# Patient Record
Sex: FEMALE | URBAN - METROPOLITAN AREA
[De-identification: names, ages, dates, MRNs, and addresses within clinical notes are randomized per-mention and may not be internally consistent; named-entity substitution may affect disease eponyms.]

---

## 2022-12-12 ENCOUNTER — LAB OUTSIDE AN ENCOUNTER (OUTPATIENT)
Dept: URBAN - METROPOLITAN AREA CLINIC 23 | Facility: CLINIC | Age: 32
End: 2022-12-12

## 2022-12-12 ENCOUNTER — TELEPHONE ENCOUNTER (OUTPATIENT)
Dept: URBAN - METROPOLITAN AREA CLINIC 78 | Facility: CLINIC | Age: 32
End: 2022-12-12

## 2022-12-12 ENCOUNTER — OFFICE VISIT (OUTPATIENT)
Dept: URBAN - METROPOLITAN AREA CLINIC 23 | Facility: CLINIC | Age: 32
End: 2022-12-12
Payer: COMMERCIAL

## 2022-12-12 VITALS
DIASTOLIC BLOOD PRESSURE: 59 MMHG | TEMPERATURE: 97.6 F | SYSTOLIC BLOOD PRESSURE: 91 MMHG | WEIGHT: 174 LBS | HEART RATE: 66 BPM

## 2022-12-12 DIAGNOSIS — F12.90 CANNABIS USE, UNSPECIFIED, UNCOMPLICATED: ICD-10-CM

## 2022-12-12 DIAGNOSIS — B18.2 CHRONIC HEPATITIS C WITHOUT HEPATIC COMA: ICD-10-CM

## 2022-12-12 DIAGNOSIS — R11.2 INTRACTABLE NAUSEA AND VOMITING: ICD-10-CM

## 2022-12-12 PROBLEM — 128302006: Status: ACTIVE | Noted: 2022-12-12

## 2022-12-12 PROCEDURE — 99204 OFFICE O/P NEW MOD 45 MIN: CPT | Performed by: INTERNAL MEDICINE

## 2022-12-12 RX ORDER — PANTOPRAZOLE SODIUM 40 MG/1
1 TABLET TABLET, DELAYED RELEASE ORAL ONCE A DAY
Qty: 90 TABLET | Refills: 1 | OUTPATIENT
Start: 2022-12-12

## 2022-12-12 NOTE — HPI-TODAY'S VISIT:
32-year-old female presents for chronic nausea and vomiting.  The onset was about 2-3 years ago.  She was hospitalized at [facility] in [place] earlier this year.  She had EGD done with Dr. Streeter.  Unremarkable per her recall.  She was told possible gastroparesis, but she has not had gastric emptying study done or treatment either. She also reports constipation, moves bowel once every week.  [place] Stool Scale 3-4.  Uses Dulcolax as needed with only partial response. Severe indigestion. Weight loss 30 pounds past 1 year. Denies bloody stool or melena. She smokes marijuana daily. CCY done in 2021 for the same, did not help with her symptom.  Pt reports partially treated HCV (pt talked to our staff after the visit).

## 2022-12-12 NOTE — PREVIOUS WORKUP REVIEWED
.ENDOSCOPIES-EGD 1/21/2022:Significant amount of food in the stomach.  Otherwise normal EGD.*Pathology:Duodenum-normal.  Gastric-mild reactive changes.  Negative for intestinal metaplasia and H. pylori infection.LABS-Labs 9/27/2021:Glucose 98, BUN 13, creatinine 0.76, sodium 141, potassium 4.6, total protein 7.1, albumin 4.1, total bilirubin 0.3, alkaline phosphatase 73, AST 31, ALT 35, WBC 11.2, hemoglobin 13.2, platelet 410.  TSH 1.84.-Labs 12/30/2020:WBC 10.6, hemoglobin 14.2, platelet 394.  BUN 14, creatinine 0.86, total bilirubin 0.2, alkaline phosphatase 86, AST 49, ALT 47.  Hep C genotype Ia.IMAGES

## 2023-01-04 ENCOUNTER — DASHBOARD ENCOUNTERS (OUTPATIENT)
Age: 33
End: 2023-01-04

## 2023-02-24 ENCOUNTER — OFFICE VISIT (OUTPATIENT)
Dept: URBAN - METROPOLITAN AREA CLINIC 80 | Facility: CLINIC | Age: 33
End: 2023-02-24